# Patient Record
Sex: FEMALE | Race: WHITE | NOT HISPANIC OR LATINO | Employment: OTHER | URBAN - METROPOLITAN AREA
[De-identification: names, ages, dates, MRNs, and addresses within clinical notes are randomized per-mention and may not be internally consistent; named-entity substitution may affect disease eponyms.]

---

## 2023-04-14 ENCOUNTER — OFFICE VISIT (OUTPATIENT)
Dept: PRIMARY CARE | Facility: CLINIC | Age: 73
End: 2023-04-14
Payer: MEDICARE

## 2023-04-14 VITALS
RESPIRATION RATE: 16 BRPM | TEMPERATURE: 98.1 F | DIASTOLIC BLOOD PRESSURE: 70 MMHG | HEIGHT: 66 IN | HEART RATE: 74 BPM | SYSTOLIC BLOOD PRESSURE: 120 MMHG | BODY MASS INDEX: 19.69 KG/M2

## 2023-04-14 DIAGNOSIS — S76.302S HAMSTRING INJURY, LEFT, SEQUELA: ICD-10-CM

## 2023-04-14 DIAGNOSIS — Z12.31 VISIT FOR SCREENING MAMMOGRAM: ICD-10-CM

## 2023-04-14 DIAGNOSIS — Z00.00 ROUTINE GENERAL MEDICAL EXAMINATION AT HEALTH CARE FACILITY: ICD-10-CM

## 2023-04-14 DIAGNOSIS — Z78.0 POST-MENOPAUSAL: ICD-10-CM

## 2023-04-14 DIAGNOSIS — N95.0 POST-MENOPAUSE BLEEDING: ICD-10-CM

## 2023-04-14 DIAGNOSIS — Z00.00 ROUTINE GENERAL MEDICAL EXAMINATION AT A HEALTH CARE FACILITY: Primary | ICD-10-CM

## 2023-04-14 PROBLEM — L11.1 GROVER'S DISEASE: Status: ACTIVE | Noted: 2023-04-14

## 2023-04-14 PROBLEM — G47.00 INSOMNIA: Status: ACTIVE | Noted: 2023-04-14

## 2023-04-14 PROBLEM — F41.9 ANXIETY: Status: ACTIVE | Noted: 2023-04-14

## 2023-04-14 PROBLEM — M85.80 OSTEOPENIA: Status: ACTIVE | Noted: 2023-04-14

## 2023-04-14 PROCEDURE — 1170F FXNL STATUS ASSESSED: CPT | Performed by: INTERNAL MEDICINE

## 2023-04-14 PROCEDURE — G0439 PPPS, SUBSEQ VISIT: HCPCS | Performed by: INTERNAL MEDICINE

## 2023-04-14 PROCEDURE — 1036F TOBACCO NON-USER: CPT | Performed by: INTERNAL MEDICINE

## 2023-04-14 PROCEDURE — 1160F RVW MEDS BY RX/DR IN RCRD: CPT | Performed by: INTERNAL MEDICINE

## 2023-04-14 PROCEDURE — 1159F MED LIST DOCD IN RCRD: CPT | Performed by: INTERNAL MEDICINE

## 2023-04-14 RX ORDER — ALPRAZOLAM 0.25 MG/1
0.25 TABLET ORAL DAILY PRN
COMMUNITY
End: 2023-05-04 | Stop reason: SDUPTHER

## 2023-04-14 RX ORDER — TRIAMCINOLONE ACETONIDE 1 MG/G
CREAM TOPICAL 2 TIMES DAILY
COMMUNITY
End: 2024-05-14 | Stop reason: SDUPTHER

## 2023-04-14 ASSESSMENT — ACTIVITIES OF DAILY LIVING (ADL)
GROCERY_SHOPPING: INDEPENDENT
DRESSING: INDEPENDENT
MANAGING_FINANCES: INDEPENDENT
BATHING: INDEPENDENT
DOING_HOUSEWORK: INDEPENDENT
TAKING_MEDICATION: INDEPENDENT

## 2023-04-14 ASSESSMENT — ENCOUNTER SYMPTOMS
LOSS OF SENSATION IN FEET: 0
OCCASIONAL FEELINGS OF UNSTEADINESS: 0
DEPRESSION: 0

## 2023-04-14 ASSESSMENT — PATIENT HEALTH QUESTIONNAIRE - PHQ9
SUM OF ALL RESPONSES TO PHQ9 QUESTIONS 1 AND 2: 0
2. FEELING DOWN, DEPRESSED OR HOPELESS: NOT AT ALL
1. LITTLE INTEREST OR PLEASURE IN DOING THINGS: NOT AT ALL

## 2023-04-14 NOTE — PROGRESS NOTES
"Subjective   Reason for Visit: Janie Lizarraga is an 72 y.o. female here for a Medicare Wellness visit.     Past Medical, Surgical, and Family History reviewed and updated in chart.    Reviewed all medications by prescribing practitioner or clinical pharmacist (such as prescriptions, OTCs, herbal therapies and supplements) and documented in the medical record.    HPI    Patient is here today for annual Medicare wellness.  She feels well she is not having any complaints other than left hamstring pain.  She pulled it a few months ago and is not getting better she has been trying home stretches and it has not improved although she was hoping it would we had a discussion and she is opted to take a referral for physical therapy for that give that a try     She has not had a mammogram done she hates doing those because she always has to do follow-up films but she is willing to do that now and she is also dealing with some postmenopausal bleeding she sees Dr. Saleem  This started in February and she saw her once in March and has an appointment in May but she is continuing to have bleeding and they tried some medications she also had an ultrasound and a biopsy she did have a large clot in the uterine cavity and she is somewhat concerned so she wanted to to discuss that and options of what she can do    She has not had a colonoscopy but she does have hemorrhoids and she wants to get those dealt with but because of the post mother's menopausal bleeding she wants to wait until she gets that dealt with before she addresses the hemorrhoids and then she will do the colonoscopy at that time  Patient Care Team:  Delmi Adames MD as PCP - General  Delmi Adames MD as PCP - [employer] PCP     Review of Systems  Insert review of systems negative    Objective   Vitals:  /70   Pulse 74   Temp 36.7 °C (98.1 °F)   Resp 16   Ht 1.676 m (5' 6\")   BMI 19.69 kg/m²       Physical Exam    CONSTITUTIONAL - well " nourished, well developed, looks like stated age, in no acute distress, not ill-appearing, and not tired appearing  SKIN - normal skin color and pigmentation, normal skin turgor without rash, lesions, or nodules visualized  HEAD - no trauma, normocephalic  EYES  -   ENT - TM's intact, no injection, no exudate,  nasal passage without discharge and patent  NECK - supple without rigidity, no neck mass was observed, no thyromegaly or thyroid nodules  CHEST - clear to auscultation, no wheezing, no crackles and no rales, good effort  CARDIAC - regular rate and regular rhythm, no skipped beats, no murmur  ABDOMEN - no organomegaly, soft, nontender, nondistended, normal bowel sounds, no guarding/rebound/rigidity  EXTREMITIES - no edema, no deformities  NEUROLOGICAL -    PSYCHIATRIC - alert, pleasant and cordial, age-appropriate  LYMPHATIC- no cervical lymphadenopathy     Assessment/Plan   Problem List Items Addressed This Visit    None  Visit Diagnoses       Routine general medical examination at a health care facility    -  Primary    Relevant Orders    CBC and Auto Differential    Comprehensive Metabolic Panel    Lipid Panel    TSH with reflex to Free T4 if abnormal    Post-menopause bleeding        Relevant Orders    CBC and Auto Differential    Visit for screening mammogram        Relevant Orders    BI mammo bilateral screening tomosynthesis    Post-menopausal        Relevant Orders    XR DEXA bone density          Patient to follow-up with me in 1 year I will get a mammogram and bone density I recommended a colonoscopy she will get that done when she gets her hemorrhoids looked at she is going to finish addressing the postmenopausal bleeding with Dr. Saleem  She will call with issues blood work is ordered

## 2023-04-18 ENCOUNTER — LAB (OUTPATIENT)
Dept: LAB | Facility: LAB | Age: 73
End: 2023-04-18
Payer: MEDICARE

## 2023-04-18 DIAGNOSIS — N95.0 POST-MENOPAUSE BLEEDING: ICD-10-CM

## 2023-04-18 DIAGNOSIS — Z00.00 ROUTINE GENERAL MEDICAL EXAMINATION AT A HEALTH CARE FACILITY: ICD-10-CM

## 2023-04-18 LAB
ALANINE AMINOTRANSFERASE (SGPT) (U/L) IN SER/PLAS: 14 U/L (ref 7–45)
ALBUMIN (G/DL) IN SER/PLAS: 4 G/DL (ref 3.4–5)
ALKALINE PHOSPHATASE (U/L) IN SER/PLAS: 63 U/L (ref 33–136)
ANION GAP IN SER/PLAS: 15 MMOL/L (ref 10–20)
ASPARTATE AMINOTRANSFERASE (SGOT) (U/L) IN SER/PLAS: 22 U/L (ref 9–39)
BASOPHILS (10*3/UL) IN BLOOD BY AUTOMATED COUNT: 0.14 X10E9/L (ref 0–0.1)
BASOPHILS/100 LEUKOCYTES IN BLOOD BY AUTOMATED COUNT: 2 % (ref 0–2)
BILIRUBIN TOTAL (MG/DL) IN SER/PLAS: 0.6 MG/DL (ref 0–1.2)
CALCIUM (MG/DL) IN SER/PLAS: 9.7 MG/DL (ref 8.6–10.6)
CARBON DIOXIDE, TOTAL (MMOL/L) IN SER/PLAS: 27 MMOL/L (ref 21–32)
CHLORIDE (MMOL/L) IN SER/PLAS: 105 MMOL/L (ref 98–107)
CHOLESTEROL (MG/DL) IN SER/PLAS: 180 MG/DL (ref 0–199)
CHOLESTEROL IN HDL (MG/DL) IN SER/PLAS: 47.2 MG/DL
CHOLESTEROL/HDL RATIO: 3.8
CREATININE (MG/DL) IN SER/PLAS: 0.83 MG/DL (ref 0.5–1.05)
EOSINOPHILS (10*3/UL) IN BLOOD BY AUTOMATED COUNT: 0.37 X10E9/L (ref 0–0.4)
EOSINOPHILS/100 LEUKOCYTES IN BLOOD BY AUTOMATED COUNT: 5.2 % (ref 0–6)
ERYTHROCYTE DISTRIBUTION WIDTH (RATIO) BY AUTOMATED COUNT: 12.5 % (ref 11.5–14.5)
ERYTHROCYTE MEAN CORPUSCULAR HEMOGLOBIN CONCENTRATION (G/DL) BY AUTOMATED: 31.9 G/DL (ref 32–36)
ERYTHROCYTE MEAN CORPUSCULAR VOLUME (FL) BY AUTOMATED COUNT: 101 FL (ref 80–100)
ERYTHROCYTES (10*6/UL) IN BLOOD BY AUTOMATED COUNT: 4.14 X10E12/L (ref 4–5.2)
GFR FEMALE: 74 ML/MIN/1.73M2
GLUCOSE (MG/DL) IN SER/PLAS: 96 MG/DL (ref 74–99)
HEMATOCRIT (%) IN BLOOD BY AUTOMATED COUNT: 42 % (ref 36–46)
HEMOGLOBIN (G/DL) IN BLOOD: 13.4 G/DL (ref 12–16)
IMMATURE GRANULOCYTES/100 LEUKOCYTES IN BLOOD BY AUTOMATED COUNT: 0.3 % (ref 0–0.9)
LDL: 117 MG/DL (ref 0–99)
LEUKOCYTES (10*3/UL) IN BLOOD BY AUTOMATED COUNT: 7.1 X10E9/L (ref 4.4–11.3)
LYMPHOCYTES (10*3/UL) IN BLOOD BY AUTOMATED COUNT: 1.98 X10E9/L (ref 0.8–3)
LYMPHOCYTES/100 LEUKOCYTES IN BLOOD BY AUTOMATED COUNT: 27.8 % (ref 13–44)
MONOCYTES (10*3/UL) IN BLOOD BY AUTOMATED COUNT: 0.77 X10E9/L (ref 0.05–0.8)
MONOCYTES/100 LEUKOCYTES IN BLOOD BY AUTOMATED COUNT: 10.8 % (ref 2–10)
NEUTROPHILS (10*3/UL) IN BLOOD BY AUTOMATED COUNT: 3.83 X10E9/L (ref 1.6–5.5)
NEUTROPHILS/100 LEUKOCYTES IN BLOOD BY AUTOMATED COUNT: 53.9 % (ref 40–80)
NRBC (PER 100 WBCS) BY AUTOMATED COUNT: 0 /100 WBC (ref 0–0)
PLATELETS (10*3/UL) IN BLOOD AUTOMATED COUNT: 372 X10E9/L (ref 150–450)
POTASSIUM (MMOL/L) IN SER/PLAS: 4.2 MMOL/L (ref 3.5–5.3)
PROTEIN TOTAL: 7.5 G/DL (ref 6.4–8.2)
SODIUM (MMOL/L) IN SER/PLAS: 143 MMOL/L (ref 136–145)
THYROTROPIN (MIU/L) IN SER/PLAS BY DETECTION LIMIT <= 0.05 MIU/L: 1.23 MIU/L (ref 0.44–3.98)
TRIGLYCERIDE (MG/DL) IN SER/PLAS: 81 MG/DL (ref 0–149)
UREA NITROGEN (MG/DL) IN SER/PLAS: 14 MG/DL (ref 6–23)
VLDL: 16 MG/DL (ref 0–40)

## 2023-04-18 PROCEDURE — 83540 ASSAY OF IRON: CPT

## 2023-04-18 PROCEDURE — 84443 ASSAY THYROID STIM HORMONE: CPT

## 2023-04-18 PROCEDURE — 85025 COMPLETE CBC W/AUTO DIFF WBC: CPT

## 2023-04-18 PROCEDURE — 80053 COMPREHEN METABOLIC PANEL: CPT

## 2023-04-18 PROCEDURE — 80061 LIPID PANEL: CPT

## 2023-04-18 PROCEDURE — 83550 IRON BINDING TEST: CPT

## 2023-04-18 PROCEDURE — 36415 COLL VENOUS BLD VENIPUNCTURE: CPT

## 2023-04-20 ENCOUNTER — TELEPHONE (OUTPATIENT)
Dept: PRIMARY CARE | Facility: CLINIC | Age: 73
End: 2023-04-20
Payer: MEDICARE

## 2023-04-20 LAB
IRON (UG/DL) IN SER/PLAS: 124 UG/DL (ref 35–150)
IRON BINDING CAPACITY (UG/DL) IN SER/PLAS: 390 UG/DL (ref 240–445)
IRON SATURATION (%) IN SER/PLAS: 32 % (ref 25–45)

## 2023-05-04 DIAGNOSIS — F41.9 ANXIETY: ICD-10-CM

## 2023-05-04 NOTE — TELEPHONE ENCOUNTER
Pt ldm on machine requesting refill on   -Xanax 0.25 mg   Pharmacy is [facility] [address]

## 2023-05-05 RX ORDER — ALPRAZOLAM 0.25 MG/1
0.25 TABLET ORAL DAILY PRN
Qty: 7 TABLET | Refills: 0 | Status: SHIPPED | OUTPATIENT
Start: 2023-05-05 | End: 2024-05-14 | Stop reason: ALTCHOICE

## 2024-04-30 DIAGNOSIS — F41.9 ANXIETY: ICD-10-CM

## 2024-04-30 DIAGNOSIS — L11.1 GROVER'S DISEASE: ICD-10-CM

## 2024-04-30 DIAGNOSIS — Z00.00 ENCOUNTER FOR MEDICARE ANNUAL WELLNESS EXAM: ICD-10-CM

## 2024-04-30 DIAGNOSIS — R79.89 ABNORMAL CBC: ICD-10-CM

## 2024-04-30 DIAGNOSIS — G47.00 INSOMNIA, UNSPECIFIED TYPE: ICD-10-CM

## 2024-04-30 DIAGNOSIS — M85.80 OSTEOPENIA, UNSPECIFIED LOCATION: ICD-10-CM

## 2024-05-11 ENCOUNTER — LAB (OUTPATIENT)
Dept: LAB | Facility: LAB | Age: 74
End: 2024-05-11
Payer: MEDICARE

## 2024-05-11 DIAGNOSIS — F41.9 ANXIETY: ICD-10-CM

## 2024-05-11 DIAGNOSIS — M85.80 OSTEOPENIA, UNSPECIFIED LOCATION: ICD-10-CM

## 2024-05-11 DIAGNOSIS — G47.00 INSOMNIA, UNSPECIFIED TYPE: ICD-10-CM

## 2024-05-11 DIAGNOSIS — L11.1 GROVER'S DISEASE: ICD-10-CM

## 2024-05-11 DIAGNOSIS — Z00.00 ENCOUNTER FOR MEDICARE ANNUAL WELLNESS EXAM: ICD-10-CM

## 2024-05-11 DIAGNOSIS — R79.89 ABNORMAL CBC: ICD-10-CM

## 2024-05-11 PROCEDURE — 86803 HEPATITIS C AB TEST: CPT

## 2024-05-11 PROCEDURE — 80053 COMPREHEN METABOLIC PANEL: CPT

## 2024-05-11 PROCEDURE — 82746 ASSAY OF FOLIC ACID SERUM: CPT

## 2024-05-11 PROCEDURE — 82728 ASSAY OF FERRITIN: CPT

## 2024-05-11 PROCEDURE — 85025 COMPLETE CBC W/AUTO DIFF WBC: CPT

## 2024-05-11 PROCEDURE — 36415 COLL VENOUS BLD VENIPUNCTURE: CPT

## 2024-05-11 PROCEDURE — 84443 ASSAY THYROID STIM HORMONE: CPT

## 2024-05-11 PROCEDURE — 80061 LIPID PANEL: CPT

## 2024-05-11 PROCEDURE — 82607 VITAMIN B-12: CPT

## 2024-05-11 PROCEDURE — 83540 ASSAY OF IRON: CPT

## 2024-05-11 PROCEDURE — 83550 IRON BINDING TEST: CPT

## 2024-05-12 LAB
ALBUMIN SERPL BCP-MCNC: 3.9 G/DL (ref 3.4–5)
ALP SERPL-CCNC: 72 U/L (ref 33–136)
ALT SERPL W P-5'-P-CCNC: 17 U/L (ref 7–45)
ANION GAP SERPL CALC-SCNC: 16 MMOL/L (ref 10–20)
AST SERPL W P-5'-P-CCNC: 20 U/L (ref 9–39)
BASOPHILS # BLD AUTO: 0.1 X10*3/UL (ref 0–0.1)
BASOPHILS NFR BLD AUTO: 1.4 %
BILIRUB SERPL-MCNC: 0.5 MG/DL (ref 0–1.2)
BUN SERPL-MCNC: 20 MG/DL (ref 6–23)
CALCIUM SERPL-MCNC: 9.3 MG/DL (ref 8.6–10.6)
CHLORIDE SERPL-SCNC: 105 MMOL/L (ref 98–107)
CHOLEST SERPL-MCNC: 169 MG/DL (ref 0–199)
CHOLESTEROL/HDL RATIO: 3.7
CO2 SERPL-SCNC: 26 MMOL/L (ref 21–32)
CREAT SERPL-MCNC: 0.82 MG/DL (ref 0.5–1.05)
EGFRCR SERPLBLD CKD-EPI 2021: 75 ML/MIN/1.73M*2
EOSINOPHIL # BLD AUTO: 0.28 X10*3/UL (ref 0–0.4)
EOSINOPHIL NFR BLD AUTO: 3.9 %
ERYTHROCYTE [DISTWIDTH] IN BLOOD BY AUTOMATED COUNT: 12.2 % (ref 11.5–14.5)
GLUCOSE SERPL-MCNC: 104 MG/DL (ref 74–99)
HCT VFR BLD AUTO: 42.9 % (ref 36–46)
HCV AB SER QL: NONREACTIVE
HDLC SERPL-MCNC: 45.5 MG/DL
HGB BLD-MCNC: 13.7 G/DL (ref 12–16)
IMM GRANULOCYTES # BLD AUTO: 0.02 X10*3/UL (ref 0–0.5)
IMM GRANULOCYTES NFR BLD AUTO: 0.3 % (ref 0–0.9)
LDLC SERPL CALC-MCNC: 111 MG/DL
LYMPHOCYTES # BLD AUTO: 1.82 X10*3/UL (ref 0.8–3)
LYMPHOCYTES NFR BLD AUTO: 25.5 %
MCH RBC QN AUTO: 32.2 PG (ref 26–34)
MCHC RBC AUTO-ENTMCNC: 31.9 G/DL (ref 32–36)
MCV RBC AUTO: 101 FL (ref 80–100)
MONOCYTES # BLD AUTO: 0.78 X10*3/UL (ref 0.05–0.8)
MONOCYTES NFR BLD AUTO: 10.9 %
NEUTROPHILS # BLD AUTO: 4.14 X10*3/UL (ref 1.6–5.5)
NEUTROPHILS NFR BLD AUTO: 58 %
NON HDL CHOLESTEROL: 124 MG/DL (ref 0–149)
NRBC BLD-RTO: 0 /100 WBCS (ref 0–0)
PLATELET # BLD AUTO: 352 X10*3/UL (ref 150–450)
POTASSIUM SERPL-SCNC: 4.4 MMOL/L (ref 3.5–5.3)
PROT SERPL-MCNC: 7.5 G/DL (ref 6.4–8.2)
RBC # BLD AUTO: 4.25 X10*6/UL (ref 4–5.2)
SODIUM SERPL-SCNC: 143 MMOL/L (ref 136–145)
TRIGL SERPL-MCNC: 61 MG/DL (ref 0–149)
TSH SERPL-ACNC: 0.75 MIU/L (ref 0.44–3.98)
VLDL: 12 MG/DL (ref 0–40)
WBC # BLD AUTO: 7.1 X10*3/UL (ref 4.4–11.3)

## 2024-05-13 DIAGNOSIS — R79.89 ABNORMAL CBC: Primary | ICD-10-CM

## 2024-05-13 LAB
FERRITIN SERPL-MCNC: 44 NG/ML (ref 8–150)
FOLATE SERPL-MCNC: >24 NG/ML
IRON SATN MFR SERPL: 21 % (ref 25–45)
IRON SERPL-MCNC: 77 UG/DL (ref 35–150)
TIBC SERPL-MCNC: 366 UG/DL (ref 240–445)
UIBC SERPL-MCNC: 289 UG/DL (ref 110–370)
VIT B12 SERPL-MCNC: 901 PG/ML (ref 211–911)

## 2024-05-14 ENCOUNTER — OFFICE VISIT (OUTPATIENT)
Dept: PRIMARY CARE | Facility: CLINIC | Age: 74
End: 2024-05-14
Payer: MEDICARE

## 2024-05-14 VITALS
DIASTOLIC BLOOD PRESSURE: 74 MMHG | HEART RATE: 73 BPM | WEIGHT: 129 LBS | OXYGEN SATURATION: 96 % | SYSTOLIC BLOOD PRESSURE: 122 MMHG | HEIGHT: 66 IN | TEMPERATURE: 97.5 F | BODY MASS INDEX: 20.73 KG/M2

## 2024-05-14 DIAGNOSIS — F32.1 MAJOR DEPRESSIVE DISORDER, SINGLE EPISODE, MODERATE (MULTI): ICD-10-CM

## 2024-05-14 DIAGNOSIS — R79.0 LOW IRON STORES: ICD-10-CM

## 2024-05-14 DIAGNOSIS — K64.0 GRADE I HEMORRHOIDS: ICD-10-CM

## 2024-05-14 DIAGNOSIS — L11.1 GROVER'S DISEASE: ICD-10-CM

## 2024-05-14 DIAGNOSIS — Z00.00 ROUTINE GENERAL MEDICAL EXAMINATION AT HEALTH CARE FACILITY: Primary | ICD-10-CM

## 2024-05-14 DIAGNOSIS — E46 PROTEIN-CALORIE MALNUTRITION, UNSPECIFIED SEVERITY (MULTI): ICD-10-CM

## 2024-05-14 DIAGNOSIS — R60.9 EDEMA, UNSPECIFIED TYPE: ICD-10-CM

## 2024-05-14 DIAGNOSIS — R01.1 HEART MURMUR: ICD-10-CM

## 2024-05-14 DIAGNOSIS — R06.09 DYSPNEA ON EXERTION: ICD-10-CM

## 2024-05-14 PROCEDURE — 1159F MED LIST DOCD IN RCRD: CPT | Performed by: INTERNAL MEDICINE

## 2024-05-14 PROCEDURE — 1160F RVW MEDS BY RX/DR IN RCRD: CPT | Performed by: INTERNAL MEDICINE

## 2024-05-14 PROCEDURE — 1036F TOBACCO NON-USER: CPT | Performed by: INTERNAL MEDICINE

## 2024-05-14 PROCEDURE — G0439 PPPS, SUBSEQ VISIT: HCPCS | Performed by: INTERNAL MEDICINE

## 2024-05-14 PROCEDURE — 99397 PER PM REEVAL EST PAT 65+ YR: CPT | Performed by: INTERNAL MEDICINE

## 2024-05-14 PROCEDURE — 99214 OFFICE O/P EST MOD 30 MIN: CPT | Performed by: INTERNAL MEDICINE

## 2024-05-14 PROCEDURE — 1170F FXNL STATUS ASSESSED: CPT | Performed by: INTERNAL MEDICINE

## 2024-05-14 PROCEDURE — 1123F ACP DISCUSS/DSCN MKR DOCD: CPT | Performed by: INTERNAL MEDICINE

## 2024-05-14 RX ORDER — TRIAMCINOLONE ACETONIDE 1 MG/G
CREAM TOPICAL 2 TIMES DAILY
Qty: 45 G | Refills: 0 | Status: SHIPPED | OUTPATIENT
Start: 2024-05-14

## 2024-05-14 ASSESSMENT — PATIENT HEALTH QUESTIONNAIRE - PHQ9
SUM OF ALL RESPONSES TO PHQ9 QUESTIONS 1 AND 2: 0
1. LITTLE INTEREST OR PLEASURE IN DOING THINGS: NOT AT ALL
2. FEELING DOWN, DEPRESSED OR HOPELESS: NOT AT ALL

## 2024-05-14 ASSESSMENT — ENCOUNTER SYMPTOMS
DEPRESSION: 0
LOSS OF SENSATION IN FEET: 0
OCCASIONAL FEELINGS OF UNSTEADINESS: 0

## 2024-05-14 ASSESSMENT — ACTIVITIES OF DAILY LIVING (ADL)
TAKING_MEDICATION: INDEPENDENT
DOING_HOUSEWORK: INDEPENDENT
BATHING: INDEPENDENT
MANAGING_FINANCES: INDEPENDENT
DRESSING: INDEPENDENT
GROCERY_SHOPPING: INDEPENDENT

## 2024-05-14 ASSESSMENT — LIFESTYLE VARIABLES
HOW OFTEN DO YOU HAVE SIX OR MORE DRINKS ON ONE OCCASION: LESS THAN MONTHLY
AUDIT-C TOTAL SCORE: 2
HOW MANY STANDARD DRINKS CONTAINING ALCOHOL DO YOU HAVE ON A TYPICAL DAY: 1 OR 2
HOW OFTEN DO YOU HAVE A DRINK CONTAINING ALCOHOL: MONTHLY OR LESS
SKIP TO QUESTIONS 9-10: 0

## 2024-05-14 NOTE — PROGRESS NOTES
Subjective   Reason for Visit: Janie Lizarraga is an 73 y.o. female here for a Medicare Wellness visit.     Past Medical, Surgical, and Family History reviewed and updated in chart.    Reviewed all medications by prescribing practitioner or clinical pharmacist (such as prescriptions, OTCs, herbal therapies and supplements) and documented in the medical record.    HPI    Patient is here for annual check-up    Last well check  1 yr     Reported health good     Dental  check  reg     Vision check reg   Vision issues no  Hearing issues n  Vaccines UTD  y    Diet healthy  mostly   Exercise reg  Caffeine  1 c  Alcohol occ  Tobacco never     Colon cancer screening  never followed up on cologuard but was having hemorrhoid bleed    Has been for hemorrhoid  but got suppository and never had fixed  Pt has refused colonoscopy since 2020    Current issues:  no acute issues      Mammo done wants to do every 3 yrs       Patient Care Team:  Delmi Adames MD as PCP - General  Delmi Adames MD as PCP - [employer] PCP     Review of Systems    GENERAL - Denies fever, fatigue or chills  SKIN - Denies rash, new skin lesions, or change in moles  EYES - Denies blurred vision, or change in visual acuity  EARS - Denies ear pain, discharge, ringing, or difficulty hearing  NOSE - Denies nasal congestion, discharge, or bleeding  MOUTH - Denies sore throat, postnasal drip or painful/difficulty swallowing  NECK - Denies pain or swelling  RESPIRATORY - Denies shortness of breath, cough, wheezing  CARDIOVASCULAR - Denies palpitations, chest pain, orthopnea, peripheral edema, syncope or claudication  GASTROINTESTINAL - Denies nausea, vomiting, diarrhea, constipation, abdominal pain, melena and or bright red blood  GENITOURINARY - Denies dysuria, frequency of urination, urgency, or hesitancy  MUSCULOSKELETAL - Denies joint or muscle pain, or back pain  NEUROLOGICAL - Denies localized numbness, weakness, or tingling  PSYCHIATRIC  "- Denies depression, anxiety, substance abuse, suicidal or homicidal ideation  ENDOCRINE - Denies heat or cold intolerance, weight loss or gain, increasing thirst  HEMATO-IMMUNOLOGIC - Denies easy bruising, bleeding, oral ulcerations or recurrent infections       Objective   Vitals:  /74   Pulse 73   Temp 36.4 °C (97.5 °F) (Oral)   Ht 1.676 m (5' 6\")   Wt 58.5 kg (129 lb)   SpO2 96%   BMI 20.82 kg/m²       Physical Exam    CONSTITUTIONAL - well nourished, well developed, looks like stated age, in no acute distress, not ill-appearing, and not tired appearing  SKIN - normal skin color and pigmentation, normal skin turgor without rash, lesions, or nodules visualized  HEAD - no trauma, normocephalic  EYES  -   ENT - TM's intact, no injection, no exudate,  nasal passage without discharge and patent  NECK - supple without rigidity, no neck mass was observed, no thyromegaly or thyroid nodules  CHEST - clear to auscultation, no wheezing, no crackles and no rales, good effort  CARDIAC -  3/6 MAU rusb   ABDOMEN - no organomegaly, soft, nontender, nondistended, normal bowel sounds, no guarding/rebound/rigidity  EXTREMITIES - no edema, no deformities  NEUROLOGICAL -  nl  b  PSYCHIATRIC - alert, pleasant and cordial, age-appropriate  LYMPHATIC- no cervical lymphadenopathy      Assessment/Plan   Problem List Items Addressed This Visit       Jaycob's disease    Relevant Medications    triamcinolone (Kenalog) 0.1 % cream    Major depressive disorder, single episode, moderate (Multi)    Current Assessment & Plan     Resolved no acute issues follow up annual         Protein-calorie malnutrition, unspecified severity (Multi)    Current Assessment & Plan     Eating healthy and working on diet            Other Visit Diagnoses       Routine general medical examination at health care facility    -  Primary    Grade I hemorrhoids        Relevant Orders    Referral to Gastroenterology          Mammo prefers every 3 " yrs    Discussed  diet and exercise  and  calcium 1200 mg and vit d 2000 international units   Pt  never did fu on positive cologuard believes is her hemorrhoid bleeding    Iron lower not bleeding a lot    Discussed  polyps and colonoscopy she states she will get hemorrhoid addressed and then do cologuard   Disucssed risk undiagnosed cancer and realtion to low iron  Discussed labs    Discussed sleep      Discussed heart murmur swelling and dypnea referred to cardiology   Pt needs to see and she is agreable   Fu in 6 mos

## 2024-07-02 PROBLEM — R09.81 CONGESTION OF PARANASAL SINUS: Status: ACTIVE | Noted: 2024-07-02

## 2024-07-02 PROBLEM — S46.919A STRAIN OF SHOULDER: Status: ACTIVE | Noted: 2024-07-02

## 2024-07-02 PROBLEM — L73.9 FOLLICULITIS: Status: ACTIVE | Noted: 2024-07-02

## 2024-07-02 PROBLEM — M25.559 ARTHRALGIA OF HIP: Status: ACTIVE | Noted: 2024-07-02

## 2024-07-02 PROBLEM — L65.9 LOSS OF HAIR: Status: ACTIVE | Noted: 2024-07-02

## 2024-07-02 PROBLEM — R06.09 DYSPNEA ON EXERTION: Status: ACTIVE | Noted: 2024-07-02

## 2024-07-23 ENCOUNTER — OFFICE VISIT (OUTPATIENT)
Dept: CARDIOLOGY | Facility: CLINIC | Age: 74
End: 2024-07-23
Payer: MEDICARE

## 2024-07-23 VITALS
DIASTOLIC BLOOD PRESSURE: 92 MMHG | HEART RATE: 85 BPM | SYSTOLIC BLOOD PRESSURE: 181 MMHG | WEIGHT: 126 LBS | BODY MASS INDEX: 20.25 KG/M2 | OXYGEN SATURATION: 98 % | HEIGHT: 66 IN

## 2024-07-23 DIAGNOSIS — R01.1 MURMUR: ICD-10-CM

## 2024-07-23 DIAGNOSIS — R03.0 ELEVATED BLOOD PRESSURE READING: ICD-10-CM

## 2024-07-23 DIAGNOSIS — R06.09 DYSPNEA ON EXERTION: Primary | ICD-10-CM

## 2024-07-23 PROCEDURE — 1123F ACP DISCUSS/DSCN MKR DOCD: CPT | Performed by: STUDENT IN AN ORGANIZED HEALTH CARE EDUCATION/TRAINING PROGRAM

## 2024-07-23 PROCEDURE — 99214 OFFICE O/P EST MOD 30 MIN: CPT | Mod: 25 | Performed by: STUDENT IN AN ORGANIZED HEALTH CARE EDUCATION/TRAINING PROGRAM

## 2024-07-23 PROCEDURE — 1126F AMNT PAIN NOTED NONE PRSNT: CPT | Performed by: STUDENT IN AN ORGANIZED HEALTH CARE EDUCATION/TRAINING PROGRAM

## 2024-07-23 PROCEDURE — 93005 ELECTROCARDIOGRAM TRACING: CPT | Performed by: STUDENT IN AN ORGANIZED HEALTH CARE EDUCATION/TRAINING PROGRAM

## 2024-07-23 PROCEDURE — 93010 ELECTROCARDIOGRAM REPORT: CPT | Performed by: STUDENT IN AN ORGANIZED HEALTH CARE EDUCATION/TRAINING PROGRAM

## 2024-07-23 PROCEDURE — 99204 OFFICE O/P NEW MOD 45 MIN: CPT | Performed by: STUDENT IN AN ORGANIZED HEALTH CARE EDUCATION/TRAINING PROGRAM

## 2024-07-23 PROCEDURE — 1036F TOBACCO NON-USER: CPT | Performed by: STUDENT IN AN ORGANIZED HEALTH CARE EDUCATION/TRAINING PROGRAM

## 2024-07-23 ASSESSMENT — PAIN SCALES - GENERAL: PAINLEVEL: 0-NO PAIN

## 2024-07-23 NOTE — PROGRESS NOTES
"Referred by Dr. Chaney for [facility]     History Of Present Illness:    Janie Lizarraga is a 74 y.o. female significant past med history was referred to cardiovascular clinic for cardiac murmur.  Patient states she has had a murmur for many years since she was young.  She was referred for evaluation.  At baseline she has occasional episodes of feeling short of breath if she goes up a flight of stairs but is not consistent and is sporadic occurrence.  She also occasionally has some tightness in her chest but this is again sporadic and not reproducible.  She has family history with her grandmother having strokes.  Baseline ECG shows sinus rhythm normal axis he has mild delayed R wave progression.  Notably she lost her  2 years ago and has progressed through the grieving process.  She went back to work interestingly enough works for a [employer] firm.  She denies tobacco but drinks socially.  Tries to maintain appropriate diet.  Most recent LDL of 111.  TSH within normal limits.  Low iron binding with iron studies.      Past Medical History:  She has a past medical history of Pain in unspecified hip and Strain of unspecified muscle, fascia and tendon at shoulder and upper arm level, unspecified arm, initial encounter.    Past Surgical History:  She has no past surgical history on file.      Social History:  She reports that she has never smoked. She has never used smokeless tobacco. She reports that she does not drink alcohol and does not use drugs.    Family History:  No family history on file.     Allergies:  Penicillins    Outpatient Medications:  Current Outpatient Medications   Medication Instructions    triamcinolone (Kenalog) 0.1 % cream Topical, 2 times daily        Last Recorded Vitals:  Vitals:    07/23/24 1539   BP: (!) 181/92   BP Location: Left arm   Patient Position: Sitting   Pulse: 85   SpO2: 98%   Weight: 57.2 kg (126 lb)   Height: 1.676 m (5' 6\")       Physical Exam:  General: No acute " "distress,  A&O x3  Skin: Warm and dry  Neck: JVD is not elevated  ENT: Moist mucous membranes no lesions appreciated  Pulmonary: CTAB  Cards: Regular rate rhythm, 2 out of 6 ejection murmur noted across the aortic gallops or rubs appreciated normal S1-S2  Abdomen: Soft nontender nondistended  Extremities: No edema or cyanosis  Psych: Appropriate mood and affect          Last Labs:  CBC -  Lab Results   Component Value Date    WBC 7.1 05/11/2024    HGB 13.7 05/11/2024    HCT 42.9 05/11/2024     (H) 05/11/2024     05/11/2024       CMP -  Lab Results   Component Value Date    CALCIUM 9.3 05/11/2024    PROT 7.5 05/11/2024    ALBUMIN 3.9 05/11/2024    AST 20 05/11/2024    ALT 17 05/11/2024    ALKPHOS 72 05/11/2024    BILITOT 0.5 05/11/2024       LIPID PANEL -   Lab Results   Component Value Date    CHOL 169 05/11/2024    TRIG 61 05/11/2024    HDL 45.5 05/11/2024    CHHDL 3.7 05/11/2024    LDLF 117 (H) 04/18/2023    VLDL 12 05/11/2024    NHDL 124 05/11/2024       RENAL FUNCTION PANEL -   Lab Results   Component Value Date    GLUCOSE 104 (H) 05/11/2024     05/11/2024    K 4.4 05/11/2024     05/11/2024    CO2 26 05/11/2024    ANIONGAP 16 05/11/2024    BUN 20 05/11/2024    CREATININE 0.82 05/11/2024    CALCIUM 9.3 05/11/2024    ALBUMIN 3.9 05/11/2024        No results found for: \"BNP\", \"HGBA1C\"    Last Cardiology Tests:  ECG:  No results found for this or any previous visit from the past 1095 days.    Assessment/Plan   1.  Cardiac murmur: Systolic ejection murmur 2 out of 6 and radiates across aortic band, no gallops or rubs.  Baseline ECG of sinus rhythm with mild delayed R wave progression.  Sporadic symptoms of shortness of breath when going up flights of stairs.  Ordered echocardiogram.  Further recs to follow based on study findings.    2.  High blood pressure reading: Blood pressures are elevated today.  Previous assessment at PCP was within normal range.  Advised patient to monitor blood " pressures at home and will reassess at next appointment.    Echocardiogram  Follow-up once testing is complete        Wu Chaney MD PhD

## 2024-07-24 LAB
ATRIAL RATE: 83 BPM
P AXIS: 56 DEGREES
P OFFSET: 190 MS
P ONSET: 138 MS
PR INTERVAL: 158 MS
Q ONSET: 217 MS
QRS COUNT: 14 BEATS
QRS DURATION: 78 MS
QT INTERVAL: 392 MS
QTC CALCULATION(BAZETT): 460 MS
QTC FREDERICIA: 437 MS
R AXIS: 32 DEGREES
T AXIS: 61 DEGREES
T OFFSET: 413 MS
VENTRICULAR RATE: 83 BPM

## 2024-08-07 ENCOUNTER — HOSPITAL ENCOUNTER (OUTPATIENT)
Dept: CARDIOLOGY | Facility: CLINIC | Age: 74
Discharge: HOME | End: 2024-08-07
Payer: MEDICARE

## 2024-08-07 DIAGNOSIS — R06.09 DYSPNEA ON EXERTION: ICD-10-CM

## 2024-08-07 DIAGNOSIS — R01.1 MURMUR: ICD-10-CM

## 2024-08-07 PROCEDURE — 93306 TTE W/DOPPLER COMPLETE: CPT

## 2024-08-07 PROCEDURE — 93306 TTE W/DOPPLER COMPLETE: CPT | Performed by: STUDENT IN AN ORGANIZED HEALTH CARE EDUCATION/TRAINING PROGRAM

## 2024-08-08 LAB
AORTIC VALVE PEAK VELOCITY: 1.58 M/S
AV PEAK GRADIENT: 9.9 MMHG
AVA (PEAK VEL): 2.48 CM2
EJECTION FRACTION APICAL 4 CHAMBER: 58.4
EJECTION FRACTION: 58 %
LEFT ATRIUM VOLUME AREA LENGTH INDEX BSA: 32.5 ML/M2
LEFT VENTRICLE INTERNAL DIMENSION DIASTOLE: 3.76 CM (ref 3.5–6)
LEFT VENTRICULAR OUTFLOW TRACT DIAMETER: 1.97 CM
MITRAL VALVE E/A RATIO: 0.76
RIGHT VENTRICLE FREE WALL PEAK S': 12 CM/S
RIGHT VENTRICLE PEAK SYSTOLIC PRESSURE: 22.6 MMHG
TRICUSPID ANNULAR PLANE SYSTOLIC EXCURSION: 1.9 CM

## 2024-08-15 ENCOUNTER — OFFICE VISIT (OUTPATIENT)
Dept: CARDIOLOGY | Facility: CLINIC | Age: 74
End: 2024-08-15
Payer: MEDICARE

## 2024-08-15 VITALS
WEIGHT: 126 LBS | BODY MASS INDEX: 20.25 KG/M2 | SYSTOLIC BLOOD PRESSURE: 137 MMHG | HEART RATE: 98 BPM | HEIGHT: 66 IN | OXYGEN SATURATION: 96 % | DIASTOLIC BLOOD PRESSURE: 78 MMHG

## 2024-08-15 DIAGNOSIS — R01.1 MURMUR: ICD-10-CM

## 2024-08-15 DIAGNOSIS — R03.0 ELEVATED BLOOD PRESSURE READING: Primary | ICD-10-CM

## 2024-08-15 PROCEDURE — 1123F ACP DISCUSS/DSCN MKR DOCD: CPT | Performed by: NURSE PRACTITIONER

## 2024-08-15 PROCEDURE — 1126F AMNT PAIN NOTED NONE PRSNT: CPT | Performed by: NURSE PRACTITIONER

## 2024-08-15 PROCEDURE — 99212 OFFICE O/P EST SF 10 MIN: CPT | Performed by: NURSE PRACTITIONER

## 2024-08-15 PROCEDURE — 1159F MED LIST DOCD IN RCRD: CPT | Performed by: NURSE PRACTITIONER

## 2024-08-15 ASSESSMENT — PAIN SCALES - GENERAL: PAINLEVEL: 0-NO PAIN

## 2024-08-15 NOTE — PROGRESS NOTES
Janie Lizarraga is a 74 y.o. female     History Of Present Illness   Mrs Lizarraga is a 74 year old female with a PMH of  heart murmur since childhood, here to review the results of her most recent echo done for complaints of shortness of breath and her murmur. She is also here to review her home BP readings which have been elevated at home 140/-90 per patient. She is very active and exercises 5 days a week.  She consume a very healthy diet and believes she is consuming a low sodium diet.        Social HX  Social History     Tobacco Use    Smoking status: Never    Smokeless tobacco: Never   Vaping Use    Vaping status: Never Used   Substance Use Topics    Alcohol use: Never    Drug use: Never          Family HX  No family history on file.       Constitutional: not feeling tired.   Eyes: no eyesight problems.  No vision loss or change in vision  ENT: no hearing loss and no nosebleeds.   Cardiovascular: No intermittent leg claudication,   No chest pain  No chest tightness   Np  heavy pressure  No shortness of breath,  No palpitations,  No lower extremity edema  The heart rate is regular  Respiratory: no chronic cough   No shortness of breath.   Gastrointestinal: no change in bowel habits and no blood in stools.   Genitourinary: no urinary frequency.   Skin: no skin rashes.   Neurological: No frequent falls.   No dizziness  No weakness  Denies headaches  Psychiatric: no depression and not suicidal.   Musculoskeletal: No joint pain  No Myalgia pain       Allergies  Allergies   Allergen Reactions    Penicillins Other          Vitals  There were no vitals taken for this visit.        Physical Exam  Constitutional: alert and in no acute distress.   Eyes: no erythema, swelling or discharge from the eye .   Neck: neck is supple, symmetric, trachea midline, no masses  and no thyromegaly .   Pulmonary: No increased work of breathing or signs of respiratory distress    Lungs clear to auscultation.    No friction  rub.  Cardiovascular: carotid pulses 2+ bilaterally with no bruit    JVP was normal, no thrills ,   Regular rhythm, normal S1 and S2, Systolic ejection murmur 2 out of 6 and radiates across aortic band, no gallops or rubs.   Pedal pulses 2+ bilaterally    No edema .   Abdomen: abdomen non-tender, no masses  and no hepatomegaly . No pulsatile mass noted  Skin: skin warm and dry, normal skin turgor .   Psychiatric judgment and insight is normal  and oriented to person, place and time .           Current/Home Meds    Current Outpatient Medications:     triamcinolone (Kenalog) 0.1 % cream, Apply topically 2 times a day., Disp: 45 g, Rfl: 0       Labs   CBC -        Lab Results   Component Value Date     WBC 7.1 05/11/2024     HGB 13.7 05/11/2024     HCT 42.9 05/11/2024      (H) 05/11/2024      05/11/2024         CMP -        Lab Results   Component Value Date     CALCIUM 9.3 05/11/2024     PROT 7.5 05/11/2024     ALBUMIN 3.9 05/11/2024     AST 20 05/11/2024     ALT 17 05/11/2024     ALKPHOS 72 05/11/2024     BILITOT 0.5 05/11/2024         LIPID PANEL -         Lab Results   Component Value Date     CHOL 169 05/11/2024     TRIG 61 05/11/2024     HDL 45.5 05/11/2024     CHHDL 3.7 05/11/2024     LDLF 117 (H) 04/18/2023     VLDL 12 05/11/2024     NHDL 124 05/11/2024         RENAL FUNCTION PANEL -         Lab Results   Component Value Date     GLUCOSE 104 (H) 05/11/2024      05/11/2024     K 4.4 05/11/2024      05/11/2024     CO2 26 05/11/2024     ANIONGAP 16 05/11/2024     BUN 20 05/11/2024     CREATININE 0.82 05/11/2024     CALCIUM 9.3 05/11/2024     ALBUMIN 3.9 05/11/2024           EKG Findings  EKG: Baseline ECG of sinus rhythm with mild delayed R wave progression.         Cardiac Service Results:  ECHO [8/7/2024]  1. Left ventricular ejection fraction is normal, by visual estimate at 55-60%.   2. Spectral Doppler shows an impaired relaxation pattern of left ventricular diastolic filling.   3.  There is normal right ventricular global systolic function.   4. RVSP within normal limits.      Assessment/Plan      HEART MURMUR:  Echo shows a normal LV systolic function with an EF of 55-60%  No valvular issues identified  Possible PFO since she has had this since childhood.  No further testing is needed at this time.    ELEVATED BLOOD PRESSURES: Patient is noted to have elevated blood pressures . Her last BP at the office was 181/92.  Today, her BP is 137/78.  She reports elevated Bps at home.  She is very reluctant to start and antihypertensive.  I have encouraged her to keep her sodium consumption to less than 2000 mg per day and incorporate yoga or relaxation exercises at home.  She is to continue to monitor her BP at home and bring these readings back with her in 6-8 weeks.  She will follow up with Dr Chaney for further evaluation and treatment.

## 2024-09-30 ENCOUNTER — APPOINTMENT (OUTPATIENT)
Dept: CARDIOLOGY | Facility: CLINIC | Age: 74
End: 2024-09-30
Payer: MEDICARE

## 2024-11-11 ENCOUNTER — LAB (OUTPATIENT)
Dept: LAB | Facility: LAB | Age: 74
End: 2024-11-11
Payer: MEDICARE

## 2024-11-11 ENCOUNTER — APPOINTMENT (OUTPATIENT)
Dept: PRIMARY CARE | Facility: CLINIC | Age: 74
End: 2024-11-11
Payer: MEDICARE

## 2024-11-11 DIAGNOSIS — R79.0 LOW IRON STORES: ICD-10-CM

## 2024-11-11 PROCEDURE — 85025 COMPLETE CBC W/AUTO DIFF WBC: CPT

## 2024-11-11 PROCEDURE — 83550 IRON BINDING TEST: CPT

## 2024-11-11 PROCEDURE — 83540 ASSAY OF IRON: CPT

## 2024-11-11 PROCEDURE — 82728 ASSAY OF FERRITIN: CPT

## 2024-11-11 PROCEDURE — 36415 COLL VENOUS BLD VENIPUNCTURE: CPT

## 2024-11-12 LAB
BASOPHILS # BLD AUTO: 0.1 X10*3/UL (ref 0–0.1)
BASOPHILS NFR BLD AUTO: 1.1 %
EOSINOPHIL # BLD AUTO: 0.15 X10*3/UL (ref 0–0.4)
EOSINOPHIL NFR BLD AUTO: 1.6 %
ERYTHROCYTE [DISTWIDTH] IN BLOOD BY AUTOMATED COUNT: 12.4 % (ref 11.5–14.5)
FERRITIN SERPL-MCNC: 57 NG/ML (ref 8–150)
HCT VFR BLD AUTO: 40.4 % (ref 36–46)
HGB BLD-MCNC: 13.1 G/DL (ref 12–16)
IMM GRANULOCYTES # BLD AUTO: 0.03 X10*3/UL (ref 0–0.5)
IMM GRANULOCYTES NFR BLD AUTO: 0.3 % (ref 0–0.9)
IRON SATN MFR SERPL: 23 % (ref 25–45)
IRON SERPL-MCNC: 85 UG/DL (ref 35–150)
LYMPHOCYTES # BLD AUTO: 1.67 X10*3/UL (ref 0.8–3)
LYMPHOCYTES NFR BLD AUTO: 18.1 %
MCH RBC QN AUTO: 32.4 PG (ref 26–34)
MCHC RBC AUTO-ENTMCNC: 32.4 G/DL (ref 32–36)
MCV RBC AUTO: 100 FL (ref 80–100)
MONOCYTES # BLD AUTO: 0.94 X10*3/UL (ref 0.05–0.8)
MONOCYTES NFR BLD AUTO: 10.2 %
NEUTROPHILS # BLD AUTO: 6.34 X10*3/UL (ref 1.6–5.5)
NEUTROPHILS NFR BLD AUTO: 68.7 %
NRBC BLD-RTO: 0 /100 WBCS (ref 0–0)
PLATELET # BLD AUTO: 342 X10*3/UL (ref 150–450)
RBC # BLD AUTO: 4.04 X10*6/UL (ref 4–5.2)
TIBC SERPL-MCNC: 370 UG/DL (ref 240–445)
UIBC SERPL-MCNC: 285 UG/DL (ref 110–370)
WBC # BLD AUTO: 9.2 X10*3/UL (ref 4.4–11.3)

## 2024-11-13 ENCOUNTER — APPOINTMENT (OUTPATIENT)
Dept: PRIMARY CARE | Facility: CLINIC | Age: 74
End: 2024-11-13
Payer: MEDICARE

## 2024-11-13 ASSESSMENT — ENCOUNTER SYMPTOMS
NECK PAIN: 0
PALPITATIONS: 0
SHORTNESS OF BREATH: 0
ORTHOPNEA: 0
SWEATS: 0
BLURRED VISION: 0
PND: 0
HEADACHES: 0
HYPERTENSION: 1

## 2024-11-14 ENCOUNTER — APPOINTMENT (OUTPATIENT)
Dept: PRIMARY CARE | Facility: CLINIC | Age: 74
End: 2024-11-14
Payer: MEDICARE

## 2024-11-14 VITALS
SYSTOLIC BLOOD PRESSURE: 132 MMHG | BODY MASS INDEX: 20.57 KG/M2 | TEMPERATURE: 97.8 F | DIASTOLIC BLOOD PRESSURE: 76 MMHG | HEART RATE: 95 BPM | OXYGEN SATURATION: 97 % | HEIGHT: 66 IN | WEIGHT: 128 LBS

## 2024-11-14 DIAGNOSIS — Z13.220 ENCOUNTER FOR LIPID SCREENING FOR CARDIOVASCULAR DISEASE: ICD-10-CM

## 2024-11-14 DIAGNOSIS — R79.89 ABNORMAL CBC: ICD-10-CM

## 2024-11-14 DIAGNOSIS — R79.0 LOW IRON STORES: ICD-10-CM

## 2024-11-14 DIAGNOSIS — Z13.6 ENCOUNTER FOR LIPID SCREENING FOR CARDIOVASCULAR DISEASE: ICD-10-CM

## 2024-11-14 DIAGNOSIS — Z00.00 ROUTINE GENERAL MEDICAL EXAMINATION AT A HEALTH CARE FACILITY: ICD-10-CM

## 2024-11-14 DIAGNOSIS — R03.0 ELEVATED BLOOD PRESSURE READING: ICD-10-CM

## 2024-11-14 DIAGNOSIS — R60.9 EDEMA, UNSPECIFIED TYPE: ICD-10-CM

## 2024-11-14 DIAGNOSIS — J01.10 ACUTE NON-RECURRENT FRONTAL SINUSITIS: Primary | ICD-10-CM

## 2024-11-14 PROCEDURE — 1158F ADVNC CARE PLAN TLK DOCD: CPT | Performed by: INTERNAL MEDICINE

## 2024-11-14 PROCEDURE — 99214 OFFICE O/P EST MOD 30 MIN: CPT | Performed by: INTERNAL MEDICINE

## 2024-11-14 PROCEDURE — G2211 COMPLEX E/M VISIT ADD ON: HCPCS | Performed by: INTERNAL MEDICINE

## 2024-11-14 PROCEDURE — 1123F ACP DISCUSS/DSCN MKR DOCD: CPT | Performed by: INTERNAL MEDICINE

## 2024-11-14 PROCEDURE — 1036F TOBACCO NON-USER: CPT | Performed by: INTERNAL MEDICINE

## 2024-11-14 PROCEDURE — 1159F MED LIST DOCD IN RCRD: CPT | Performed by: INTERNAL MEDICINE

## 2024-11-14 PROCEDURE — 1160F RVW MEDS BY RX/DR IN RCRD: CPT | Performed by: INTERNAL MEDICINE

## 2024-11-14 RX ORDER — AZITHROMYCIN 250 MG/1
TABLET, FILM COATED ORAL
Qty: 6 TABLET | Refills: 0 | Status: SHIPPED | OUTPATIENT
Start: 2024-11-14 | End: 2024-11-19

## 2024-11-14 ASSESSMENT — ENCOUNTER SYMPTOMS
LOSS OF SENSATION IN FEET: 0
DEPRESSION: 0
OCCASIONAL FEELINGS OF UNSTEADINESS: 0

## 2024-11-14 ASSESSMENT — PATIENT HEALTH QUESTIONNAIRE - PHQ9
1. LITTLE INTEREST OR PLEASURE IN DOING THINGS: NOT AT ALL
SUM OF ALL RESPONSES TO PHQ9 QUESTIONS 1 AND 2: 0
2. FEELING DOWN, DEPRESSED OR HOPELESS: NOT AT ALL

## 2024-11-14 NOTE — PROGRESS NOTES
"Subjective   Patient ID: Janie Lizarraga is a 74 y.o. female who presents for Follow-up (EP.  Follow up on lab work.  Labs done.  Had cold for 3 weeks and went into gums and would like rx for antibiotic and referral for dentist.).  HPI  Here for fu   Had cyst removed right chest  by azul olson and was removed yesterday by dermatology she had a lipoma removed by the same neurologist several years ago she sees Dr. Jeffrey  Had bw done    Has  old sxs x 3 weeks and  now pain in teeth and hurts    Thinks has issue w gums    No fever or chills and  had a lot of drainage but now dissipated   Has been working and doing well    Review of Systems  Review of systems was performed and is otherwise negative except as noted in HPI.      Objective   /76   Pulse 95   Temp 36.6 °C (97.8 °F) (Oral)   Ht 1.676 m (5' 6\")   Wt 58.1 kg (128 lb)   SpO2 97%   BMI 20.66 kg/m²      Physical Exam  HEENT is normal  Lungs clear bilaterally  Heart is regular rate rhythm no murmurs  Abdomen benign  Lower extremities no edema     Assessment/Plan   Diagnoses and all orders for this visit:  Acute non-recurrent frontal sinusitis  -     azithromycin (Zithromax) 250 mg tablet; Take 2 tablets (500 mg) by mouth once daily for 1 day, THEN 1 tablet (250 mg) once daily for 4 days. Take 2 tabs (500 mg) by mouth today, than 1 daily for 4 days..  Abnormal CBC    Call with issues  Blood work ordered  Follow-up with me in 6 months  Will treat sinus spectrum Zithromax  Call if symptoms fail to resolve    Delmi Adames MD  "

## 2025-02-25 ENCOUNTER — OFFICE VISIT (OUTPATIENT)
Dept: CARDIOLOGY | Facility: CLINIC | Age: 75
End: 2025-02-25
Payer: MEDICARE

## 2025-02-25 VITALS
DIASTOLIC BLOOD PRESSURE: 77 MMHG | SYSTOLIC BLOOD PRESSURE: 173 MMHG | HEIGHT: 66 IN | WEIGHT: 125 LBS | BODY MASS INDEX: 20.09 KG/M2 | OXYGEN SATURATION: 92 % | HEART RATE: 94 BPM

## 2025-02-25 DIAGNOSIS — R01.1 MURMUR: ICD-10-CM

## 2025-02-25 DIAGNOSIS — R03.0 ELEVATED BLOOD PRESSURE READING: Primary | ICD-10-CM

## 2025-02-25 PROCEDURE — 99214 OFFICE O/P EST MOD 30 MIN: CPT | Performed by: STUDENT IN AN ORGANIZED HEALTH CARE EDUCATION/TRAINING PROGRAM

## 2025-02-25 PROCEDURE — 1036F TOBACCO NON-USER: CPT | Performed by: STUDENT IN AN ORGANIZED HEALTH CARE EDUCATION/TRAINING PROGRAM

## 2025-02-25 PROCEDURE — 1123F ACP DISCUSS/DSCN MKR DOCD: CPT | Performed by: STUDENT IN AN ORGANIZED HEALTH CARE EDUCATION/TRAINING PROGRAM
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
## 2025-03-19 ENCOUNTER — APPOINTMENT (OUTPATIENT)
Dept: CARDIOLOGY | Facility: CLINIC | Age: 75
End: 2025-03-19
Payer: MEDICARE

## 2025-03-21 ENCOUNTER — APPOINTMENT (OUTPATIENT)
Dept: CARDIOLOGY | Facility: CLINIC | Age: 75
End: 2025-03-21
Payer: MEDICARE

## 2025-03-24 ENCOUNTER — OFFICE VISIT (OUTPATIENT)
Dept: CARDIOLOGY | Facility: CLINIC | Age: 75
End: 2025-03-24
Payer: MEDICARE

## 2025-03-24 VITALS
HEART RATE: 80 BPM | BODY MASS INDEX: 19.77 KG/M2 | HEIGHT: 66 IN | WEIGHT: 123 LBS | SYSTOLIC BLOOD PRESSURE: 157 MMHG | DIASTOLIC BLOOD PRESSURE: 81 MMHG | OXYGEN SATURATION: 98 %

## 2025-03-24 DIAGNOSIS — R03.0 ELEVATED BLOOD PRESSURE READING: Primary | ICD-10-CM

## 2025-03-24 DIAGNOSIS — R01.1 MURMUR: ICD-10-CM

## 2025-03-24 PROCEDURE — 99204 OFFICE O/P NEW MOD 45 MIN: CPT

## 2025-03-24 PROCEDURE — 99214 OFFICE O/P EST MOD 30 MIN: CPT

## 2025-03-24 PROCEDURE — 1159F MED LIST DOCD IN RCRD: CPT

## 2025-03-24 PROCEDURE — 1160F RVW MEDS BY RX/DR IN RCRD: CPT

## 2025-03-24 PROCEDURE — 1123F ACP DISCUSS/DSCN MKR DOCD: CPT

## 2025-03-24 NOTE — PROGRESS NOTES
"Chief Complaint:   BP follow up     History Of Present Illness:    Janie iLzarraga is a 74 y.o. female presenting today for BP follow.      Denies any CP, SOB, palpitations, lightheadedness, syncope, orthopnea, PND, lower extremity edema.      Last Recorded Vitals:  Vitals:    03/24/25 1538   BP: 157/81   BP Location: Left arm   Patient Position: Sitting   Pulse: 80   SpO2: 98%   Weight: 55.8 kg (123 lb)   Height: 1.676 m (5' 6\")     Past Medical History:  She has a past medical history of Pain in unspecified hip and Strain of unspecified muscle, fascia and tendon at shoulder and upper arm level, unspecified arm, initial encounter.    Past Surgical History:  She has no past surgical history on file.      Social History:  She reports that she has never smoked. She has never used smokeless tobacco. She reports that she does not drink alcohol and does not use drugs.    Family History:  No family history on file.     Allergies:  Penicillins    Outpatient Medications:  Current Outpatient Medications   Medication Instructions    triamcinolone (Kenalog) 0.1 % cream Topical, 2 times daily     Physical Exam:  General: no acute distress  HEENT: EOMI, no scleral icterus.  Lungs: Clear to auscultation bilaterally without wheezing, rales, or rhonchi.  Cardiovascular: Regular rhythm and rate. Normal S1 and S2. No murmurs, rubs, or gallops are appreciated. JVP normal.  Abdomen: Soft, nontender, nondistended. Bowel sounds present.  Extremities: Warm and well perfused with equal 2+ pulses bilaterally.  No edema.  Neurologic: Alert and oriented x3.      Last Labs:  CBC -  Lab Results   Component Value Date    WBC 9.2 11/11/2024    HGB 13.1 11/11/2024    HCT 40.4 11/11/2024     11/11/2024     11/11/2024     CMP -  Lab Results   Component Value Date    CALCIUM 9.3 05/11/2024    PROT 7.5 05/11/2024    ALBUMIN 3.9 05/11/2024    AST 20 05/11/2024    ALT 17 05/11/2024    ALKPHOS 72 05/11/2024    BILITOT 0.5 05/11/2024 " "    LIPID PANEL -   Lab Results   Component Value Date    CHOL 169 05/11/2024    TRIG 61 05/11/2024    HDL 45.5 05/11/2024    CHHDL 3.7 05/11/2024    LDLF 117 (H) 04/18/2023    VLDL 12 05/11/2024    NHDL 124 05/11/2024     RENAL FUNCTION PANEL -   Lab Results   Component Value Date    GLUCOSE 104 (H) 05/11/2024     05/11/2024    K 4.4 05/11/2024     05/11/2024    CO2 26 05/11/2024    ANIONGAP 16 05/11/2024    BUN 20 05/11/2024    CREATININE 0.82 05/11/2024    CALCIUM 9.3 05/11/2024    ALBUMIN 3.9 05/11/2024      No results found for: \"BNP\", \"HGBA1C\"    Last Cardiology Tests:  ECG:  ECG 12 lead (Clinic Performed) 07/23/2024  NSR    Echo:  Transthoracic Echo (TTE) Complete 08/07/2024  1. Left ventricular ejection fraction is normal, by visual estimate at 55-60%.   2. Spectral Doppler shows an impaired relaxation pattern of left ventricular diastolic filling.   3. There is normal right ventricular global systolic function.   4. RVSP within normal limits.    Cath:  No results found for this or any previous visit from the past 1095 days.    Stress Test:  No results found for this or any previous visit from the past 1095 days.    Cardiac Imaging:  No results found for this or any previous visit from the past 1095 days.    I have personally reviewed most recent PCP, cardiology, vascular, studies and/or documentation.      Assessment/Plan   1.  Cardiac murmur: Systolic ejection murmur 2 out of 6 and radiates across aortic band, no gallops or rubs.  Baseline ECG of sinus rhythm with mild delayed R wave progression.  Sporadic symptoms of shortness of breath when going up flights of stairs.  Echo within normal limits without significant valve pathology.     2.  Hypertension: Blood pressures remain elevated.  I suspect blood pressure may be persistently elevated in the setting of chronic NSAID usage [Aleve for exercise routine recovery].  Patient will discontinue Aleve usage and return in a few weeks for blood " pressure check.  If blood pressures remain elevated and will consider initiation of HCTZ.          Jenn Pina, APRN-CNP

## 2025-05-04 LAB
ALBUMIN SERPL-MCNC: 3.9 G/DL (ref 3.6–5.1)
ALP SERPL-CCNC: 65 U/L (ref 37–153)
ALT SERPL-CCNC: 16 U/L (ref 6–29)
ANION GAP SERPL CALCULATED.4IONS-SCNC: 9 MMOL/L (CALC) (ref 7–17)
AST SERPL-CCNC: 22 U/L (ref 10–35)
BASOPHILS # BLD AUTO: 70 CELLS/UL (ref 0–200)
BASOPHILS NFR BLD AUTO: 1.3 %
BILIRUB SERPL-MCNC: 0.6 MG/DL (ref 0.2–1.2)
BUN SERPL-MCNC: 13 MG/DL (ref 7–25)
CALCIUM SERPL-MCNC: 9.3 MG/DL (ref 8.6–10.4)
CHLORIDE SERPL-SCNC: 106 MMOL/L (ref 98–110)
CHOLEST SERPL-MCNC: 158 MG/DL
CHOLEST/HDLC SERPL: 3.2 (CALC)
CO2 SERPL-SCNC: 28 MMOL/L (ref 20–32)
CREAT SERPL-MCNC: 0.81 MG/DL (ref 0.6–1)
EGFRCR SERPLBLD CKD-EPI 2021: 76 ML/MIN/1.73M2
EOSINOPHIL # BLD AUTO: 140 CELLS/UL (ref 15–500)
EOSINOPHIL NFR BLD AUTO: 2.6 %
ERYTHROCYTE [DISTWIDTH] IN BLOOD BY AUTOMATED COUNT: 11.9 % (ref 11–15)
FERRITIN SERPL-MCNC: 31 NG/ML (ref 16–288)
GLUCOSE SERPL-MCNC: 95 MG/DL (ref 65–99)
HCT VFR BLD AUTO: 40.2 % (ref 35–45)
HDLC SERPL-MCNC: 49 MG/DL
HGB BLD-MCNC: 13.4 G/DL (ref 11.7–15.5)
IRON SATN MFR SERPL: 20 % (CALC) (ref 16–45)
IRON SERPL-MCNC: 72 MCG/DL (ref 45–160)
LDLC SERPL CALC-MCNC: 96 MG/DL (CALC)
LYMPHOCYTES # BLD AUTO: 1323 CELLS/UL (ref 850–3900)
LYMPHOCYTES NFR BLD AUTO: 24.5 %
MCH RBC QN AUTO: 33.4 PG (ref 27–33)
MCHC RBC AUTO-ENTMCNC: 33.3 G/DL (ref 32–36)
MCV RBC AUTO: 100.2 FL (ref 80–100)
MONOCYTES # BLD AUTO: 572 CELLS/UL (ref 200–950)
MONOCYTES NFR BLD AUTO: 10.6 %
NEUTROPHILS # BLD AUTO: 3294 CELLS/UL (ref 1500–7800)
NEUTROPHILS NFR BLD AUTO: 61 %
NONHDLC SERPL-MCNC: 109 MG/DL (CALC)
PLATELET # BLD AUTO: 349 THOUSAND/UL (ref 140–400)
PMV BLD REES-ECKER: 10 FL (ref 7.5–12.5)
POTASSIUM SERPL-SCNC: 4.6 MMOL/L (ref 3.5–5.3)
PROT SERPL-MCNC: 7.7 G/DL (ref 6.1–8.1)
RBC # BLD AUTO: 4.01 MILLION/UL (ref 3.8–5.1)
SODIUM SERPL-SCNC: 143 MMOL/L (ref 135–146)
TIBC SERPL-MCNC: 368 MCG/DL (CALC) (ref 250–450)
TRIGL SERPL-MCNC: 51 MG/DL
TSH SERPL-ACNC: 0.8 MIU/L (ref 0.4–4.5)
WBC # BLD AUTO: 5.4 THOUSAND/UL (ref 3.8–10.8)

## 2025-05-14 ENCOUNTER — APPOINTMENT (OUTPATIENT)
Dept: PRIMARY CARE | Facility: CLINIC | Age: 75
End: 2025-05-14
Payer: MEDICARE

## 2025-05-14 VITALS
HEIGHT: 66 IN | DIASTOLIC BLOOD PRESSURE: 88 MMHG | WEIGHT: 128 LBS | HEART RATE: 96 BPM | OXYGEN SATURATION: 96 % | TEMPERATURE: 97.1 F | SYSTOLIC BLOOD PRESSURE: 146 MMHG | BODY MASS INDEX: 20.57 KG/M2

## 2025-05-14 DIAGNOSIS — Z12.31 ENCOUNTER FOR SCREENING MAMMOGRAM FOR MALIGNANT NEOPLASM OF BREAST: ICD-10-CM

## 2025-05-14 DIAGNOSIS — R03.0 ELEVATED BLOOD PRESSURE READING: Primary | ICD-10-CM

## 2025-05-14 DIAGNOSIS — Z12.11 COLON CANCER SCREENING: ICD-10-CM

## 2025-05-14 DIAGNOSIS — L11.1 GROVER'S DISEASE: ICD-10-CM

## 2025-05-14 DIAGNOSIS — Z00.00 ROUTINE GENERAL MEDICAL EXAMINATION AT A HEALTH CARE FACILITY: ICD-10-CM

## 2025-05-14 PROBLEM — F32.1 MAJOR DEPRESSIVE DISORDER, SINGLE EPISODE, MODERATE (MULTI): Status: RESOLVED | Noted: 2024-05-14 | Resolved: 2025-05-14

## 2025-05-14 PROCEDURE — 1036F TOBACCO NON-USER: CPT | Performed by: INTERNAL MEDICINE

## 2025-05-14 PROCEDURE — 1160F RVW MEDS BY RX/DR IN RCRD: CPT | Performed by: INTERNAL MEDICINE

## 2025-05-14 PROCEDURE — G0439 PPPS, SUBSEQ VISIT: HCPCS | Performed by: INTERNAL MEDICINE

## 2025-05-14 PROCEDURE — 1158F ADVNC CARE PLAN TLK DOCD: CPT | Performed by: INTERNAL MEDICINE

## 2025-05-14 PROCEDURE — 1159F MED LIST DOCD IN RCRD: CPT | Performed by: INTERNAL MEDICINE

## 2025-05-14 PROCEDURE — 99213 OFFICE O/P EST LOW 20 MIN: CPT | Performed by: INTERNAL MEDICINE

## 2025-05-14 PROCEDURE — 1170F FXNL STATUS ASSESSED: CPT | Performed by: INTERNAL MEDICINE

## 2025-05-14 PROCEDURE — 99397 PER PM REEVAL EST PAT 65+ YR: CPT | Performed by: INTERNAL MEDICINE

## 2025-05-14 ASSESSMENT — ACTIVITIES OF DAILY LIVING (ADL)
DRESSING: INDEPENDENT
GROCERY_SHOPPING: INDEPENDENT
DOING_HOUSEWORK: INDEPENDENT
MANAGING_FINANCES: INDEPENDENT
TAKING_MEDICATION: INDEPENDENT
BATHING: INDEPENDENT

## 2025-05-14 ASSESSMENT — PATIENT HEALTH QUESTIONNAIRE - PHQ9
1. LITTLE INTEREST OR PLEASURE IN DOING THINGS: NOT AT ALL
10. IF YOU CHECKED OFF ANY PROBLEMS, HOW DIFFICULT HAVE THESE PROBLEMS MADE IT FOR YOU TO DO YOUR WORK, TAKE CARE OF THINGS AT HOME, OR GET ALONG WITH OTHER PEOPLE: SOMEWHAT DIFFICULT
SUM OF ALL RESPONSES TO PHQ9 QUESTIONS 1 AND 2: 2
2. FEELING DOWN, DEPRESSED OR HOPELESS: SEVERAL DAYS
2. FEELING DOWN, DEPRESSED OR HOPELESS: NOT AT ALL
SUM OF ALL RESPONSES TO PHQ9 QUESTIONS 1 AND 2: 0
1. LITTLE INTEREST OR PLEASURE IN DOING THINGS: SEVERAL DAYS

## 2025-05-14 ASSESSMENT — LIFESTYLE VARIABLES
HOW OFTEN DO YOU HAVE A DRINK CONTAINING ALCOHOL: MONTHLY OR LESS
SKIP TO QUESTIONS 9-10: 0
HOW OFTEN DO YOU HAVE SIX OR MORE DRINKS ON ONE OCCASION: LESS THAN MONTHLY
AUDIT-C TOTAL SCORE: 3
HOW MANY STANDARD DRINKS CONTAINING ALCOHOL DO YOU HAVE ON A TYPICAL DAY: 3 OR 4

## 2025-05-14 ASSESSMENT — ENCOUNTER SYMPTOMS
DEPRESSION: 0
OCCASIONAL FEELINGS OF UNSTEADINESS: 0
LOSS OF SENSATION IN FEET: 0

## 2025-05-14 NOTE — PROGRESS NOTES
Subjective   Reason for Visit: Jnaie Lizarraga is an 74 y.o. female here for a Medicare Wellness visit.     Past Medical, Surgical, and Family History reviewed and updated in chart.    Reviewed all medications by prescribing practitioner or clinical pharmacist (such as prescriptions, OTCs, herbal therapies and supplements) and documented in the medical record.    HPI      History of Present Illness  Janie Lizarraga is a 74 year old female who presents for an annual physical exam.    She had blood work done prior to the visit, which will be reviewed during the appointment. Her last mammogram was in the summer of 2023, and she has not had a colonoscopy. She was supposed to follow up on a Cologuard test from 2020 that detected blood, likely due to hemorrhoids.  Patient has been referred every year since then for colonoscopy to do so stating that the test was only positive because of her hemorrhoidal bleeding and states that if she had a problem problem she would definitely go get it checked out she would like to have the hemorrhoids addressed and she has been referred back to [facility] and advised strongly that she needs to get a colonoscopy    She has ongoing issues with hemorrhoids since at least 2020. Previous treatment with suppositories was ineffective.    She has a history of [place]'s disease, previously treated with a prescription that caused burning, leading to discontinuation. She currently manages it with lotion. The rash is located across her trunk.  She sees Dr. Jeffrey.  Advised to follow-up for different treatment but she states it is not bothering her currently    She has a history of high blood pressure, with readings as high as 178 mmHg at a cardiologist's office, but typically around 135/68 mmHg at home. She has not started medication for hypertension and monitors her blood pressure at home. Her blood pressure tends to be higher during medical visits.  She has a monitor at  home and it relates to an maryellen on her phone.  Advised her to continue to monitor and that the upper limits of normal are 140/90 which she promised to adhere to.  She has follow-up with cardiology in the fall and I advised her to bring her log as well as her machine to that appointment    She experiences occasional lightheadedness, which she attributes to age or possibly dehydration. No chest pain, breathing problems, or swelling in the legs. She reports good energy levels, although not as high as in the past.    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 She does not smoke and has reduced her alcohol consumption.      Last well check  1 yr     Reported health  good     Dental check  reg     Vision check reg   Vision issues  just dry eyes   Hearing issues n  Vaccines UTD   had pneumonia     Diet  healthy   Exercise tries but working 3 days a week   Caffeine  1 c  Alcohol wine  2 to 3 times a week   Tobacco never     Colon cancer screening  never went after positive cologuard referred again took referral to have hemorrhoids looked at       Patient Care Team:  Delmi Adames MD as PCP - General  Delmi Adames MD as PCP - [employer] PCP     Review of Systems  GENERAL - Denies fever, fatigue or chills  SKIN - Denies rash, new skin lesions, or change in moles she has Grovers disease  EYES - Denies blurred vision, or change in visual acuity  EARS - Denies ear pain, discharge, ringing, or difficulty hearing  NOSE - Denies nasal congestion, discharge, or bleeding  MOUTH - Denies sore throat, postnasal drip or painful/difficulty swallowing  NECK - Denies pain or swelling  RESPIRATORY - Denies shortness of breath, cough, wheezing  CARDIOVASCULAR - Denies palpitations, chest pain, orthopnea, peripheral edema, syncope or claudication  GASTROINTESTINAL - Denies nausea, vomiting, diarrhea, constipation, abdominal pain, melena and  "or bright red blood she has occasional hemorrhoid pain  GENITOURINARY - Denies dysuria, frequency of urination, urgency, or hesitancy  MUSCULOSKELETAL - Denies joint or muscle pain, or back pain  NEUROLOGICAL - Denies localized numbness, weakness, or tingling  PSYCHIATRIC - Denies depression, anxiety, substance abuse, suicidal or homicidal ideation  ENDOCRINE - Denies heat or cold intolerance, weight loss or gain, increasing thirst  HEMATO-IMMUNOLOGIC - Denies easy bruising, bleeding, oral ulcerations or recurrent infections    Objective   Vitals:  /88   Pulse 96   Temp 36.2 °C (97.1 °F) (Oral)   Ht 1.676 m (5' 6\")   Wt 58.1 kg (128 lb)   SpO2 96%   BMI 20.66 kg/m²       Physical Exam  CONSTITUTIONAL - well nourished, well developed, looks like stated age, in no acute distress, not ill-appearing, and not tired appearing  SKIN - normal skin color and pigmentation, normal skin turgor without rash, lesions, or nodules visualized  HEAD - no trauma, normocephalic  EYES - pupils are equal and reactive to light, extraocular muscles are intact, and normal external exam  ENT - TM's intact, no injection, no signs of infection, uvula midline, normal tongue movement and throat normal, no exudate, nasal passage without discharge and patent  NECK - supple without rigidity, no neck mass was observed, no thyromegaly or thyroid nodules  CHEST - clear to auscultation, no wheezing, no crackles and no rales, good effort  CARDIAC - regular rate and regular rhythm, no skipped beats, no murmur  ABDOMEN - no organomegaly, soft, nontender, nondistended, normal bowel sounds, no guarding/rebound/rigidity, negative McBurney sign and negative Abernathy sign  EXTREMITIES - no edema, no deformities  NEUROLOGICAL - normal gait, normal balance, normal motor, no ataxia, DTRs equal and symmetrical; alert, oriented and no focal signs  PSYCHIATRIC - alert, pleasant and cordial, age-appropriate  IMMUNOLOGIC - no cervical " lymphadenopathy    Assessment/Plan   Diagnoses and all orders for this visit:  Elevated blood pressure reading  Routine general medical examination at a health care facility  Jaycob's disease  Encounter for screening mammogram for malignant neoplasm of breast  -     BI mammo bilateral screening tomosynthesis; Future  Colon cancer screening  -     Referral to Gastroenterology; Future      Assessment & Plan  Hypertension  Blood pressure readings have been variable, with some elevated readings in February and March. Current reading is 14/88, slightly elevated. Home readings average 135/68. She experiences stress-related increases during visits. No current antihypertensive medication. Goal is to maintain blood pressure under 140/90. Medication may be initiated if readings exceed this threshold.  - Monitor blood pressure at home and maintain a chart of readings  - Ensure blood pressure remains under 140/90  - Follow up with Doctor Chaney in October  - Contact provider if blood pressure readings exceed 140/90 for potential medication initiation    Hemorrhoids  Hemorrhoids may have contributed to a positive Cologuard test in 2020. Previous treatments were ineffective. She is interested in banding, a noninvasive and effective office procedure. A colonoscopy is recommended to rule out colon cancer before banding.  - Refer to gastroenterology in [place] for evaluation and potential banding of hemorrhoids  - Discuss colon cancer screening options with gastroenterologist    Jaycob's disease  Jaycob's disease affects the trunk. Previous prescription caused burning sensation, leading to discontinuation. Currently using lotion for management. Follow-up with dermatologist needed for alternative treatments.  - Follow up with dermatologist, Doctor Duenas, to discuss alternative treatments that do not cause burning    Dry eye syndrome  Reports dry eyes but no significant issues. Recently evaluated by Doctor Allen.    General  Health Maintenance  She is in good health, exercises regularly, maintains a healthy diet with limited alcohol intake, and has no smoking history. Recent dental and eye exams completed. Pneumonia vaccination is up to date. Mammogram is overdue. Further evaluation advised due to a positive Cologuard test in 2020, despite colon cancer screening not generally recommended after age 75.  - Order mammogram  - Encourage regular exercise and healthy diet  - Continue regular dental and eye exams           This medical note was created with the assistance of artificial intelligence (AI) for documentation purposes. The content has been reviewed and confirmed by the healthcare provider for accuracy and completeness. Patient consented to the use of audio recording and use of AI during their visit.

## 2025-10-07 ENCOUNTER — APPOINTMENT (OUTPATIENT)
Dept: CARDIOLOGY | Facility: CLINIC | Age: 75
End: 2025-10-07
Payer: MEDICARE

## 2026-05-20 ENCOUNTER — APPOINTMENT (OUTPATIENT)
Dept: PRIMARY CARE | Facility: CLINIC | Age: 76
End: 2026-05-20
Payer: MEDICARE